# Patient Record
Sex: FEMALE | Race: WHITE | NOT HISPANIC OR LATINO | Employment: PART TIME | URBAN - METROPOLITAN AREA
[De-identification: names, ages, dates, MRNs, and addresses within clinical notes are randomized per-mention and may not be internally consistent; named-entity substitution may affect disease eponyms.]

---

## 2017-02-02 ENCOUNTER — OFFICE VISIT - HEALTHEAST (OUTPATIENT)
Dept: OBGYN | Facility: CLINIC | Age: 32
End: 2017-02-02

## 2017-02-02 DIAGNOSIS — Z00.00 ROUTINE GENERAL MEDICAL EXAMINATION AT A HEALTH CARE FACILITY: ICD-10-CM

## 2017-02-02 ASSESSMENT — MIFFLIN-ST. JEOR: SCORE: 1327.51

## 2017-02-07 LAB
BKR LAB AP ABNORMAL BLEEDING: NO
BKR LAB AP BIRTH CONTROL/HORMONES: NORMAL
BKR LAB AP CERVICAL APPEARANCE: NORMAL
BKR LAB AP GYN ADEQUACY: NORMAL
BKR LAB AP GYN INTERPRETATION: NORMAL
BKR LAB AP HPV REFLEX: NORMAL
BKR LAB AP LMP: NORMAL
BKR LAB AP PATIENT STATUS: NORMAL
BKR LAB AP PREVIOUS ABNORMAL: NORMAL
BKR LAB AP PREVIOUS NORMAL: 2014
HIGH RISK?: NO
HPV INTERPRETATION - HISTORICAL: NORMAL
HPV INTERPRETER - HISTORICAL: NORMAL
PATH REPORT.COMMENTS IMP SPEC: NORMAL
RESULT FLAG (HE HISTORICAL CONVERSION): NORMAL

## 2017-05-04 ENCOUNTER — OFFICE VISIT - HEALTHEAST (OUTPATIENT)
Dept: OBGYN | Facility: CLINIC | Age: 32
End: 2017-05-04

## 2017-05-04 DIAGNOSIS — N39.0 URINARY TRACT INFECTION, SITE NOT SPECIFIED: ICD-10-CM

## 2017-05-04 DIAGNOSIS — R30.0 DYSURIA: ICD-10-CM

## 2017-05-04 ASSESSMENT — MIFFLIN-ST. JEOR: SCORE: 1323.43

## 2017-08-17 ENCOUNTER — OFFICE VISIT - HEALTHEAST (OUTPATIENT)
Dept: FAMILY MEDICINE | Facility: CLINIC | Age: 32
End: 2017-08-17

## 2017-08-17 DIAGNOSIS — J33.9 NASAL POLYP: ICD-10-CM

## 2017-08-17 DIAGNOSIS — B37.9 YEAST INFECTION: ICD-10-CM

## 2017-08-17 DIAGNOSIS — N89.8 VAGINAL DISCHARGE: ICD-10-CM

## 2017-08-17 DIAGNOSIS — B96.89 BV (BACTERIAL VAGINOSIS): ICD-10-CM

## 2017-08-17 DIAGNOSIS — N76.0 BV (BACTERIAL VAGINOSIS): ICD-10-CM

## 2017-09-07 ENCOUNTER — COMMUNICATION - HEALTHEAST (OUTPATIENT)
Dept: FAMILY MEDICINE | Facility: CLINIC | Age: 32
End: 2017-09-07

## 2017-09-11 ENCOUNTER — COMMUNICATION - HEALTHEAST (OUTPATIENT)
Dept: FAMILY MEDICINE | Facility: CLINIC | Age: 32
End: 2017-09-11

## 2017-09-15 ENCOUNTER — COMMUNICATION - HEALTHEAST (OUTPATIENT)
Dept: FAMILY MEDICINE | Facility: CLINIC | Age: 32
End: 2017-09-15

## 2017-09-15 DIAGNOSIS — F41.9 ANXIETY: ICD-10-CM

## 2017-09-27 ENCOUNTER — AMBULATORY - HEALTHEAST (OUTPATIENT)
Dept: NURSING | Facility: CLINIC | Age: 32
End: 2017-09-27

## 2017-09-28 ENCOUNTER — OFFICE VISIT - HEALTHEAST (OUTPATIENT)
Dept: OTOLARYNGOLOGY | Facility: CLINIC | Age: 32
End: 2017-09-28

## 2017-09-28 DIAGNOSIS — J33.9 NASAL POLYPS: ICD-10-CM

## 2017-09-28 DIAGNOSIS — J30.89 NON-SEASONAL ALLERGIC RHINITIS DUE TO OTHER ALLERGIC TRIGGER: ICD-10-CM

## 2017-10-04 ENCOUNTER — HOSPITAL ENCOUNTER (OUTPATIENT)
Dept: CT IMAGING | Facility: HOSPITAL | Age: 32
Discharge: HOME | End: 2017-10-04
Attending: OTOLARYNGOLOGY

## 2017-10-04 DIAGNOSIS — J33.9 NASAL POLYPS: ICD-10-CM

## 2018-06-12 ENCOUNTER — OFFICE VISIT - HEALTHEAST (OUTPATIENT)
Dept: MIDWIFE SERVICES | Facility: CLINIC | Age: 33
End: 2018-06-12

## 2018-06-12 DIAGNOSIS — O26.899 RH NEGATIVE STATE IN ANTEPARTUM PERIOD: ICD-10-CM

## 2018-06-12 DIAGNOSIS — Z32.00 POSSIBLE PREGNANCY, NOT CONFIRMED: ICD-10-CM

## 2018-06-12 DIAGNOSIS — O20.0 BLOODY SHOW AND CRAMPING IN EARLY PREGNANCY: ICD-10-CM

## 2018-06-12 DIAGNOSIS — O20.9 VAGINAL BLEEDING IN PREGNANCY, FIRST TRIMESTER: ICD-10-CM

## 2018-06-12 DIAGNOSIS — Z67.91 RH NEGATIVE STATE IN ANTEPARTUM PERIOD: ICD-10-CM

## 2018-06-12 LAB — HCG SERPL-ACNC: ABNORMAL MLU/ML (ref 0–4)

## 2018-06-12 ASSESSMENT — MIFFLIN-ST. JEOR: SCORE: 1363.8

## 2018-06-13 ENCOUNTER — HOSPITAL ENCOUNTER (OUTPATIENT)
Dept: ULTRASOUND IMAGING | Facility: HOSPITAL | Age: 33
Discharge: HOME | End: 2018-06-13
Attending: ADVANCED PRACTICE MIDWIFE

## 2018-06-13 DIAGNOSIS — O20.9 VAGINAL BLEEDING IN PREGNANCY, FIRST TRIMESTER: ICD-10-CM

## 2018-06-13 DIAGNOSIS — O20.0 BLOODY SHOW AND CRAMPING IN EARLY PREGNANCY: ICD-10-CM

## 2018-06-13 LAB — ANTIBODY SCREEN: NEGATIVE

## 2018-06-14 ENCOUNTER — COMMUNICATION - HEALTHEAST (OUTPATIENT)
Dept: MIDWIFE SERVICES | Facility: CLINIC | Age: 33
End: 2018-06-14

## 2018-06-18 ENCOUNTER — COMMUNICATION - HEALTHEAST (OUTPATIENT)
Dept: ADMINISTRATIVE | Facility: CLINIC | Age: 33
End: 2018-06-18

## 2019-02-08 ENCOUNTER — HOME CARE/HOSPICE - HEALTHEAST (OUTPATIENT)
Dept: HOME HEALTH SERVICES | Facility: HOME HEALTH | Age: 34
End: 2019-02-08

## 2019-02-09 ENCOUNTER — HOME CARE/HOSPICE - HEALTHEAST (OUTPATIENT)
Dept: HOME HEALTH SERVICES | Facility: HOME HEALTH | Age: 34
End: 2019-02-09

## 2019-09-13 ENCOUNTER — OFFICE VISIT - HEALTHEAST (OUTPATIENT)
Dept: FAMILY MEDICINE | Facility: CLINIC | Age: 34
End: 2019-09-13

## 2019-09-13 DIAGNOSIS — S20.129A POSTPARTUM MILK BLEB: ICD-10-CM

## 2019-09-13 DIAGNOSIS — O92.29 POSTPARTUM MILK BLEB: ICD-10-CM

## 2019-10-07 ENCOUNTER — AMBULATORY - HEALTHEAST (OUTPATIENT)
Dept: NURSING | Facility: CLINIC | Age: 34
End: 2019-10-07

## 2019-10-31 ENCOUNTER — RECORDS - HEALTHEAST (OUTPATIENT)
Dept: ADMINISTRATIVE | Facility: OTHER | Age: 34
End: 2019-10-31

## 2019-10-31 ENCOUNTER — HOSPITAL ENCOUNTER (OUTPATIENT)
Dept: ULTRASOUND IMAGING | Facility: CLINIC | Age: 34
Discharge: HOME | End: 2019-10-31
Attending: OBSTETRICS & GYNECOLOGY

## 2019-10-31 DIAGNOSIS — N63.0 BREAST MASS IN FEMALE: ICD-10-CM

## 2020-07-27 ENCOUNTER — OFFICE VISIT - HEALTHEAST (OUTPATIENT)
Dept: FAMILY MEDICINE | Facility: CLINIC | Age: 35
End: 2020-07-27

## 2020-07-27 ENCOUNTER — COMMUNICATION - HEALTHEAST (OUTPATIENT)
Dept: SCHEDULING | Facility: CLINIC | Age: 35
End: 2020-07-27

## 2020-07-27 DIAGNOSIS — S80.869A INSECT BITE OF MULTIPLE SITES OF LOWER EXTREMITY WITH LOCAL REACTION, UNSPECIFIED LATERALITY, INITIAL ENCOUNTER: ICD-10-CM
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
## 2020-10-08 ENCOUNTER — AMBULATORY - HEALTHEAST (OUTPATIENT)
Dept: NURSING | Facility: CLINIC | Age: 35
End: 2020-10-08

## 2020-10-08 DIAGNOSIS — Z23 NEEDS FLU SHOT: ICD-10-CM

## 2021-05-30 VITALS — HEIGHT: 65 IN | BODY MASS INDEX: 23.49 KG/M2 | WEIGHT: 141 LBS

## 2021-05-30 VITALS — BODY MASS INDEX: 23.34 KG/M2 | WEIGHT: 140.1 LBS | HEIGHT: 65 IN

## 2021-05-31 VITALS — BODY MASS INDEX: 24 KG/M2 | WEIGHT: 142 LBS

## 2021-06-01 VITALS — HEIGHT: 65 IN | BODY MASS INDEX: 24.83 KG/M2 | WEIGHT: 149 LBS

## 2021-06-01 NOTE — PROGRESS NOTES
"ASSESSMENT:  1. Postpartum milk bleb         PLAN:  Monitor, heat, nipple balm. Make sure latch is good and re-latch if painful or seems like not proper. Continue to monitor for signs of infection. None present on exam today.   No problem-specific Assessment & Plan notes found for this encounter.      There are no Patient Instructions on file for this visit.    No orders of the defined types were placed in this encounter.    There are no discontinued medications.    No follow-ups on file.    CHIEF COMPLAINT:  Chief Complaint   Patient presents with     Breast Problem     pt is breast feeding, was seen by her OB/GYN yesterday and was told it was a \"bleb \" from breast feeding. Pt states it is red around the scab       HISTORY OF PRESENT ILLNESS:  Diana is a 34 y.o. female here today for follow-up for a small lesion on her right breast.  Patient was salt seen by her OB/GYN yesterday and told it was a bleb.  She wanted a second opinion today.  Area is not painful or tender.  She did have a recent mastitis but this is resolved.  She states it is more red on the other side and is concerned that there could be a problem.  Baby does not have the best latch, she thinks this may have been the cause.  Not causing any pain or discomfort.  She is breast-feeding fine.    REVIEW OF SYSTEMS:   Pertinent items are noted in HPI.  All other systems are negative    PFSH:  Reviewed, no changes    TOBACCO USE:  Social History     Tobacco Use   Smoking Status Former Smoker   Smokeless Tobacco Former User     Quit date: 8/17/2010   Tobacco Comment    4-5 years smoked        VITALS:  Vitals:    09/13/19 1120   BP: 138/89   Patient Site: Left Arm   Patient Position: Sitting   Cuff Size: Adult Regular   Pulse: (!) 112   Resp: 16   Weight: 149 lb (67.6 kg)     Wt Readings from Last 3 Encounters:   09/13/19 149 lb (67.6 kg)   02/07/19 164 lb (74.4 kg)   06/12/18 149 lb (67.6 kg)       PHYSICAL EXAM:   /89 (Patient Site: Left Arm, Patient " Position: Sitting, Cuff Size: Adult Regular)   Pulse (!) 112   Resp 16   Wt 149 lb (67.6 kg)   BMI 25.58 kg/m    General appearance: alert, appears stated age and cooperative  Breasts: Inspection negative, right nipple with milk bleb left upper, nontender  Skin: Skin color, texture, turgor normal. No rashes or lesions    DATA REVIEWED:  Additional History from Old Records Summarized (2): 0  Decision to Obtain Records (1): 0  Radiology Tests Summarized or Ordered (1): 0  Labs Reviewed or Ordered (1): 0  Medicine Test Summarized or Ordered (1): 0  Independent Review of EKG or X-RAY(2 each): 0    The visit lasted a total of 20 minutes face to face with the patient. Over 50% of the time was spent counseling and educating the patient about plan of care.    MEDICATIONS:  Current Outpatient Medications   Medication Sig Dispense Refill     prenatal no115-iron-folic acid 29 mg iron- 1 mg Chew Chew daily.       No current facility-administered medications for this visit.        This note has been dictated using voice recognition software. Any grammatical or context distortions are unintentional and inherent to the software

## 2021-06-03 VITALS
BODY MASS INDEX: 25.58 KG/M2 | HEART RATE: 112 BPM | SYSTOLIC BLOOD PRESSURE: 138 MMHG | DIASTOLIC BLOOD PRESSURE: 89 MMHG | WEIGHT: 149 LBS | RESPIRATION RATE: 16 BRPM

## 2021-06-08 NOTE — PROGRESS NOTES
Assessment:     1. Routine general medical examination at a health care facility  Gynecologic Cytology (PAP Smear)        Plan:      I recommended she eat a healthy diet and exercise on a regular basis. Pap smear and HPV testing performed today. Otherwise up to date on preventive measures. Routine exams encouraged. Discussed stress reduction techniques.       Subjective:     Diana Eugene is a 31 y.o. female who presents for an annual exam.     The patient reports that there is not domestic violence in her life.     Healthy Habits:   Regular Exercise: Yes  Sunscreen Use: Yes  Healthy Diet: Yes  Dental Visits Regularly: Yes  Sexually active: Yes  Colonoscopy: N/A  Prevention of Osteoporosis: Yes  Last Dexa: N/A    Immunization History   Administered Date(s) Administered     Influenza, seasonal,quad inj 36+ mos 11/09/2015, 11/22/2016     Influenza, seasonal,quad inj 6-35 mos 11/25/2014     Immunization status: up to date and documented.    Gynecologic History  No LMP recorded.  Contraception: none  Last Pap: 2014. Results were: normal      OB History   No data available       Current Outpatient Prescriptions   Medication Sig Dispense Refill     CHOLECALCIFEROL, VITAMIN D3, (VITAMIN D3 ORAL) Take by mouth.       DOCOSAHEXANOIC ACID/EPA (FISH OIL ORAL) Take by mouth.       fluticasone (FLONASE) 50 mcg/actuation nasal spray 1 spray into each nostril daily.       montelukast (SINGULAIR) 10 mg tablet TAKE 1 TABLET BY MOUTH EVERY DAY 30 tablet 1     VITAMIN A ORAL Take by mouth.       VITAMIN E ACETATE (VITAMIN E ORAL) Take by mouth.       predniSONE (DELTASONE) 10 MG tablet 40mg po QD for 3 days, then 30mg po QD for 3 days, then 20mg po QD for 3 days, then 10mg po QD for 3 days, then stop 30 tablet 0     No current facility-administered medications for this visit.      Past Medical History:   Diagnosis Date     Nasal polyps      History reviewed. No pertinent surgical history.  No known drug allergies  Family History  "  Problem Relation Age of Onset     Heart disease Maternal Grandfather      Stroke Maternal Grandfather      Colon cancer Neg Hx      Social History     Social History     Marital status:      Spouse name: N/A     Number of children: N/A     Years of education: N/A     Occupational History     Not on file.     Social History Main Topics     Smoking status: Former Smoker     Smokeless tobacco: Not on file     Alcohol use Not on file     Drug use: Not on file     Sexual activity: Yes     Birth control/ protection: None     Other Topics Concern     Not on file     Social History Narrative       Review of Systems  General:  Negative except as noted above  Eyes: Negative except as noted above  Ears/Nose/Throat: Negative except as noted above  Cardiovascular: Negative except as noted above  Respiratory:  Negative except as noted above  Gastrointestinal:  Negative except as noted above  Musculoskeletal:  Negative except as noted above  Skin: Negative except as noted above  Neurologic: Negative except as noted above  Psychiatric: Negative except as noted above  Endocrine: Negative except as noted above  Heme/Lymphatic: Negative except as noted above   Allergic/Immunologic: Negative except as noted above      Objective:      Visit Vitals     /74 (Patient Site: Left Arm, Patient Position: Sitting, Cuff Size: Adult Regular)     Pulse 72     Ht 5' 4.5\" (1.638 m)     Wt 141 lb (64 kg)     SpO2 100%     BMI 23.83 kg/m2       Physical Exam:  General Appearance: Alert, cooperative, no distress.  Head: Normocephalic, without obvious abnormality, atraumatic  Eyes: PERRL, conjunctiva/corneas clear, EOM's intact  Ears: Normal TM's and external ear canals, both ears  Nose: Nares normal, septum midline,mucosa normal, no drainage  Throat: Lips, mucosa, and tongue normal  Neck: Supple, symmetrical, trachea midline, no adenopathy;  thyroid: not enlarged, symmetric, no tenderness/mass/nodules  Back: Symmetric, no curvature, ROM " normal, no CVA tenderness  Lungs: Clear to auscultation bilaterally, respirations unlabored  Breasts: No breast masses, tenderness, asymmetry, or nipple discharge.  Heart: Regular rate and rhythm, S1 and S2 normal, no murmur, rub, or gallop, Abdomen: Soft, non-tender, bowel sounds active all four quadrants,  no masses, no organomegaly  Pelvic: Normal-appearing female genitalia.  No adnexal masses or cervical motion tenderness on bimanual exam. Pap smear and HPV testing obtained today.   Extremities: Extremities normal, atraumatic, no cyanosis or edema  Skin: Skin color, texture, turgor normal, no rashes or lesions  Lymph nodes: Cervical, supraclavicular, and axillary nodes normal  Neurologic: Normal  Psych: Normal affect.  Does not appear anxious or depressed.

## 2021-06-10 NOTE — PROGRESS NOTES
"Diana Eugene is a 34 y.o. female who is being evaluated via a billable video visit.      The patient has been notified of following:     \"This video visit will be conducted via a call between you and your physician/provider. We have found that certain health care needs can be provided without the need for an in-person physical exam.  This service lets us provide the care you need with a video conversation.  If a prescription is necessary we can send it directly to your pharmacy.  If lab work is needed we can place an order for that and you can then stop by our lab to have the test done at a later time.    Video visits are billed at different rates depending on your insurance coverage. Please reach out to your insurance provider with any questions.    If during the course of the call the physician/provider feels a video visit is not appropriate, you will not be charged for this service.\"    Patient has given verbal consent to a Video visit? Yes  How would you like to obtain your AVS? AVS Preference: MyChart.  If dropped by the video visit, the video invitation should be sent to: Other e-mail: my chart  Will anyone else be joining your video visit? No        Video Start Time: 1:59 PM    Additional provider notes:     Assessment/Plan:      Insect bite of multiple sites of lower extremity with local reaction, unspecified laterality, initial encounter  Discussed with patient that the bilateral and multiple nature of the bites would be atypical for tick bites as no ticks were found.  Also the local reactions do not signify greater risk of a transmitted disease like West Nile without other symptoms.  She denies systemic symptoms, other easy bleeding or bruising.  She will try a non-drowsy antihistamine during the day and benadryl at night.  Did not recommend any local treatment of the bites as they are not symptomatic.  If these multiply or worsen, she will follow up for further evaluation.        Subjective:       " Diana Eugene is a 34 y.o. female who presents for a discussion of some unusual appearing bug bites.  She was outside for much of the weekend, and sustained several insect bites to her thighs and ankles.  Her ankles look normal with mild bumps related to the bites, however on her thighs, she has developed an atypical reaction with bruising around the bites.  She has not had this happen in the past.  She denies fever, chills or malaise.  She denies unexplained bruising elsewhere, nosebleeds or easy bleeding with teeth brushing.  The bites are not particularly itchy.  She has taken some benadryl.      The following portions of the patient's history were reviewed and updated as appropriate: allergies, current medications, past medical history, past social history and problem list.      Current Outpatient Medications:      prenatal no115-iron-folic acid 29 mg iron- 1 mg Chew, Chew daily., Disp: , Rfl:     Review of Systems   Pertinent items are noted in HPI.      Objective:      There were no vitals taken for this visit.    General appearance: alert, appears stated age and cooperative  Head: Normocephalic, without obvious abnormality, atraumatic  Eyes: conjunctivae clear, sclerae anicteric  Lungs: breathing unlabored, no cough during the visit  Skin: central excoriation with ring of normal skin and a surrounding purple ring consistent with contusion on each inner thigh and 2 additional lesions outer right thigh  Neurologic: Grossly normal, alert and oriented x3, good historian              Video-Visit Details    Type of service:  Video Visit    Video End Time (time video stopped): 2:06 PM  Originating Location (pt. Location): Home    Distant Location (provider location):  [facility]/OB     Platform used for Video Visit: Shyann Zarate MD

## 2021-06-10 NOTE — PROGRESS NOTES
"Assessment / Impression     1. Urinary tract infection, site not specified     2. Dysuria  Urinalysis-UC if Indicated    Culture, Urine       Plan:     1. Bactrim DS BID for 7 days dispensed. Encouraged water intake. Discussed signs and sxs of when to return if sxs don't resolve or worsen.     Subjective:      HPI: Diana Eugene is a 31 y.o. female with  Increased urinary urgency and frequency that started 2 nights ago. States has never had UTI, but developed sxs that got somewhat better yesterday and now are worse today. Denies hematuria, back pain, fever, or N/V. Otherwise healthy without concerns. Hasn't tried anything otc.       Review of Systems  Pertinent items are noted in HPI.       Objective:     /76 (Patient Site: Left Arm, Patient Position: Sitting, Cuff Size: Adult Regular)  Pulse 72  Resp 16  Ht 5' 4.5\" (1.638 m)  Wt 140 lb 1.6 oz (63.5 kg)  LMP 04/17/2017 (Approximate)  Breastfeeding? No  BMI 23.68 kg/m2  Physical Examination: General appearance - alert, well appearing, and in no distress  Mouth: mucous membranes moist, pharynx normal without lesions  Neck: supple, no significant adenopathy  Lymphatics: no palpable lymphadenopathy  Chest: clear to auscultation, no wheezes, rales or rhonchi, symmetric air entry  Heart: normal rate, regular rhythm, normal S1, S2, no murmurs, rubs, clicks or gallops  Abdomen: soft, nontender, nondistended, no masses or organomegaly  Back: No CVA tenderness bilaterally    Labs: UA + blood, LE, protein, bacteria.     "

## 2021-06-12 NOTE — PROGRESS NOTES
ASSESSMENT and PLAN:  1. Vaginal discharge  -Wet prep returned positive for clue cells and  yeast.  We discussed treating with the 5 days of metronidazole as well as starting fluconazole for 3 doses one every 3 days.  If symptoms persist she should let me know and come back for reevaluation.  We discussed avoiding tight constrictive clothing on these hot summer months, cotton undergarments, airing out in the evening, showering shortly after exercise and sweating.  - Wet Prep, Vaginal    2. Nasal polyp  -Following with allergist.  Stable as long as using nasal steroid spray and antihistamine    3.  Heart palpitations-very intermittent and likely nonpathologic at this time.  Discussed with patient reasons for which to pursue further workup and we discussed potential triggers.  Continue to monitor.  Normal exam    -Tdap up-to-date from 3 years ago at [facility] and will update us in health maintenance      Reviewed previous office notes and labs  There are no Patient Instructions on file for this visit.    Orders Placed This Encounter   Procedures     Wet Prep, Vaginal     There are no discontinued medications.    No Follow-up on file.      The visit lasted a total of 15 minutes face to face with the patient. Over 50% of the time was spent counseling and educating the patient     CHIEF COMPLAINT:  Chief Complaint   Patient presents with     Vaginal Itching     Vaginal itching and OTC testing the Ph was abnormal.  Some discharge. Started on sunday, feels a little better today.      Establish Care       HISTORY OF PRESENT ILLNESS:  Diana Eugene is a 31 y.o. female presents to establish care here today.  Her main concern is vaginal itching and discharge that she has noticed since Sunday.  She has no pertinent medical history and no history of diabetes.  No recurrent vaginosis.  She has never had yeast infection before and notes that she is going to  some Monistat but the instructions had not to self treat if  she has never had a yeast infection.  she picked up pH strips and it had a slight color change so she decided to come in and get checked out.  It is a yellowish discharge.  Very pruritic and sometimes burns at the end of urination otherwise no other urinary symptoms.  Has not been on antibiotics since May.  Only has one partner and it is her  and no concerns about STDs.  Does not do any douching.  No changes in fragrances or soaps.  She does note that she has been biking a lot lately and over the weekend did not shower for a few days and did not know if that had triggered things.  Only other pertinent history is nasal polyps and has seen an allergist.  Is using a daily antihistamine such as Claritin and as long as she is taking the medication she does well.  They got a new kitten recently and seems to have had more of a reaction to the cat even though she is never tested positive for cat allergy and previous cat did not have as many issues.  She also notes that she has had sinus-like reactions when she takes Advil so makes a conscious effort not to take.  She is using a nasal steroid spray.  She thinks it might be Nasacort but cannot remember if it is that her fluticasone.  She is not using the Singulair as long as she keeps up on her Claritin and also notes that she was given prednisone by the allergist but has not yet use the medication.  Looks like she did see an ENT doctor several years ago.  No CT imaging as of yet and she will be following up if the nasal polyp does not improve.  On her problem history is a history of heart palpitations that she may note of in 2014.  They do not seem to worsen with working out which she no longer does and are not associated with any lightheadedness or chest pressure.  When they come on once a week it may be last 10-30 seconds.  No history of arrhythmia in the family.  She does consume caffeine at least one cup a day.  Has not noted any triggers.  No history of  anxiousness.  Uncomplicated pregnancies and likely updated her Tdap 3 years ago.    REVIEW OF SYSTEMS:    Comprehensive review of systems is negative except as noted in the HPI.     PFSH:  Tobacco use and medications reviewed below.     TOBACCO USE:  History   Smoking Status     Former Smoker   Smokeless Tobacco     Former User     Quit date: 8/17/2010     Comment: 4-5 years smoked        VITALS:  Vitals:    08/17/17 1050   BP: 110/82   Pulse: 72   Resp: 16   Temp: 98.4  F (36.9  C)   TempSrc: Oral   Weight: 142 lb (64.4 kg)     Wt Readings from Last 3 Encounters:   08/17/17 142 lb (64.4 kg)   05/04/17 140 lb 1.6 oz (63.5 kg)   02/02/17 141 lb (64 kg)       PHYSICAL EXAM:  Constitutional:  Reveals a 31 y.o. female in NAD.  Vitals:  Per nursing notes.  Neck:  Supple, thyroid not palpable.  Cardiac:  Regular rate and rhythm without murmurs, rubs, or gallops. Carotids without bruits. Legs without edema. Palpation of the radial pulse regular.  Lungs: Clear.  Respiratory effort normal.  Skin:   Without rash, bruise, or palpable lesions.  Rheumatologic: Normal joints and nails of the hands.  Neurologic:  Cranial nerves II-XII intact.     Psychiatric:  Mood appropriate, memory intact.   Vaginal exam: Normal external exam with no lesions or erythema.  Internal speculum exam normal with normal-appearing cervix.  There is increased amount of vaginal discharge that is thin and yellowish appearing.      MEDICATIONS:  Current Outpatient Prescriptions   Medication Sig Dispense Refill     CHOLECALCIFEROL, VITAMIN D3, (VITAMIN D3 ORAL) Take by mouth.       DOCOSAHEXANOIC ACID/EPA (FISH OIL ORAL) Take by mouth.       fluticasone (FLONASE) 50 mcg/actuation nasal spray 1 spray into each nostril daily.       montelukast (SINGULAIR) 10 mg tablet TAKE 1 TABLET BY MOUTH EVERY DAY 30 tablet 1     predniSONE (DELTASONE) 10 MG tablet 40mg po QD for 3 days, then 30mg po QD for 3 days, then 20mg po QD for 3 days, then 10mg po QD for 3 days,  then stop 30 tablet 0     VITAMIN A ORAL Take by mouth.       VITAMIN E ACETATE (VITAMIN E ORAL) Take by mouth.       No current facility-administered medications for this visit.

## 2021-06-13 NOTE — PROGRESS NOTES
HPI: This patient is a 33yo F who presents back for re-evaluation of nasal congestion. The patient reports chronic nasal congestion over the past several years. She has tried sprays now with minimal improvement and got sidetracked with other life happenings to come back and address this sooner. Has not had true sinus infection issues such as facial pain, purulent drainage, epistaxis, or dental pain. Does have decreased sense of smell, but not true anosmia. Denies fevers, weight loss, epistaxis, headaches, other significant symptoms.      Past medical history, surgical history, social history, family history, medications, and allergies have been reviewed with the patient and are documented above.     Review of Systems: a 10-system review was performed. Pertinent positives are noted in the HPI and on a separate scanned document in the chart.     PHYSICAL EXAMINATION:  GEN: no acute distress, normocephalic  EYES: extraocular movements are intact, pupils are equal and round. Sclera clear.   NOSE: anterior nares are patent. There are no masses or lesions. The septum is non-obstructing. Significantly boggy and enlarged turbinates. Difficult to fully assess the upper nasal cavity due to turbinate enlargement.  OC/OP: clear, dentition is in good repair. The tongue and palate are fully mobile and symmetric. The floor of mouth, base of tongue, and tonsils are soft and symmetric.  NECK: soft and supple. No lymphadenopathy or masses. Airway is midline.  NEURO: CN II-XII are intact bilaterally. alert and oriented. No nystagmus. Gait is normal.  PULM: breathing comfortably on room air, normal chest expansion with respiration     MEDICAL DECISION-MAKING: This patient is a 33yo F with continued allergic rhinitis and congestion. Discussed continuation of the nasal steroid sprays. Will also check a CT scan to evaluate for polyps, etc.

## 2021-06-16 PROBLEM — J33.9 NASAL POLYP: Status: ACTIVE | Noted: 2017-08-17

## 2021-06-18 NOTE — PROGRESS NOTES
Assessment/Plan:        Diagnoses and all orders for this visit:  Early pregnancy between 2y6s-4m1b with vaginal spotting and right sided dull pain.    Menstrual cycles ~32 days long    Possible pregnancy, not confirmed  -     Beta-hCG, Quantitative  -     Beta-hCG, Quantitative; Future    Vaginal bleeding in pregnancy, first trimester  -     HML Antibody Screen  -     rho(D) immune globulin injection 300 mcg (RHOPHYLAC); Inject 2 mL (300 mcg total) into the shoulder, thigh, or buttocks once.  -     US OB < 14 Weeks With Transvaginal; Future; Expected date: 6/12/18    Rh negative state in antepartum period  -     rho(D) immune globulin injection 300 mcg (RHOPHYLAC); Inject 2 mL (300 mcg total) into the shoulder, thigh, or buttocks once. (Will receive before leaving clinic)    Bloody show and cramping in early pregnancy  -     US OB < 14 Weeks With Transvaginal; Future; Expected date: 6/12/18    Discussed Diana's current s/sx.  Acknowledged that bleeding is scant and difficult to know what it means.  Explained rationale of beta-quant x2 (one today and one in 48 hours).  Also discussed importance of u/s in ruling out ectopic.  We were able to schedule the u/s for 6/13 @ 1245 at [facility].  I discussed that if the pregnancy is <6 weeks, that likely a heartbeat will not be seen, but our purpose at this point is to r/o ectopic due to the vague, dull pain she has on the right side.  Discussed that bleeding is common in first trimester and that ~25% of women see some spotting or bleeding in otherwise viable pregnancies.  Also discussed that if it is an SAB, there is nothing she did to cause this.      Reviewed travel precautions if she does end up traveling.      Explained follow-up with CNMs on labs, ultrasound, etc.    ROCHELLE Cam CNM  6/12/2018  4:46 PM    Total time spent with patient 45 minutes, >50% counseling, education and coordination of care.              Subjective:    Patient ID: Diana HURD  Jabier is a 32 y.o. female here for confirmation of pregnancy, with concerns regarding spotting in early pregnancy (see ROS) and dull right-sided abdominal pain which first started on 6/9/18.  She thought these symptoms signaled the start of her period, however, when her period still had not come, she took a Home UPT yesterday and it was positive.  Her  is thrilled, she is a little hesitant, only because they are planning to leave for a [place] cruise in one week.  Their flight leaves on 6/20.  Diana is Rh negative - in her chart from delivery in 2013.  Her menstrual cycles come about every 32 days.  Her LMP was 3/27/18, and then she had her next LMP on 5/1/18, so she expected her next period to come around 6/5/18.  This would put her around 3b7h-8f9w pregnant (probably more in the 5 week range).  Denies s/sx of vaginal infection - states she had BV/yeast last year and this is not like that.      HPI    The following portions of the patient's history were reviewed and updated as appropriate: allergies, current medications, past family history, past medical history, past social history, past surgical history and problem list.    Review of Systems   Constitutional: Negative.    HENT: Negative.    Eyes: Negative.    Respiratory: Negative.    Cardiovascular: Negative.    Gastrointestinal: Negative.    Genitourinary:        Vaginal spotting - brown discharge - noted when she wipes that started on 6/9/18.  Also noticing right sided dull pain.   Musculoskeletal: Negative.    Skin: Negative.    Allergic/Immunologic: Negative.    Neurological: Negative.    Hematological: Negative.    Psychiatric/Behavioral: Negative.              Objective:    Physical Exam   Constitutional: She is oriented to person, place, and time. She appears well-developed and well-nourished.   Eyes: Pupils are equal, round, and reactive to light.   Pulmonary/Chest: Effort normal.   Musculoskeletal: Normal range of motion.   Neurological: She  is alert and oriented to person, place, and time.   Skin: Skin is warm and dry.   Psychiatric: She has a normal mood and affect. Her behavior is normal. Judgment and thought content normal.

## 2021-06-19 ENCOUNTER — HEALTH MAINTENANCE LETTER (OUTPATIENT)
Age: 36
End: 2021-06-19

## 2021-07-14 PROBLEM — Z34.90 PREGNANT: Status: RESOLVED | Noted: 2019-02-07 | Resolved: 2020-07-27

## 2021-09-10 ENCOUNTER — OFFICE VISIT (OUTPATIENT)
Dept: FAMILY MEDICINE | Facility: CLINIC | Age: 36
End: 2021-09-10

## 2021-09-10 VITALS
HEART RATE: 107 BPM | TEMPERATURE: 98.7 F | SYSTOLIC BLOOD PRESSURE: 132 MMHG | BODY MASS INDEX: 20.98 KG/M2 | DIASTOLIC BLOOD PRESSURE: 84 MMHG | WEIGHT: 122.25 LBS | RESPIRATION RATE: 16 BRPM

## 2021-09-10 DIAGNOSIS — R19.01 RIGHT UPPER QUADRANT ABDOMINAL MASS: Primary | ICD-10-CM

## 2021-09-10 DIAGNOSIS — Z23 NEED FOR PROPHYLACTIC VACCINATION AND INOCULATION AGAINST INFLUENZA: ICD-10-CM

## 2021-09-10 DIAGNOSIS — R45.89 ANXIETY ABOUT HEALTH: ICD-10-CM

## 2021-09-10 PROCEDURE — 99214 OFFICE O/P EST MOD 30 MIN: CPT | Performed by: FAMILY MEDICINE

## 2021-09-10 RX ORDER — ESCITALOPRAM OXALATE 10 MG/1
10 TABLET ORAL DAILY
Qty: 30 TABLET | Refills: 1 | Status: SHIPPED | OUTPATIENT
Start: 2021-09-10 | End: 2022-01-07

## 2021-09-10 ASSESSMENT — ANXIETY QUESTIONNAIRES
2. NOT BEING ABLE TO STOP OR CONTROL WORRYING: NEARLY EVERY DAY
1. FEELING NERVOUS, ANXIOUS, OR ON EDGE: NEARLY EVERY DAY
3. WORRYING TOO MUCH ABOUT DIFFERENT THINGS: NEARLY EVERY DAY
7. FEELING AFRAID AS IF SOMETHING AWFUL MIGHT HAPPEN: NEARLY EVERY DAY
4. TROUBLE RELAXING: NEARLY EVERY DAY
GAD7 TOTAL SCORE: 21
5. BEING SO RESTLESS THAT IT IS HARD TO SIT STILL: NEARLY EVERY DAY
6. BECOMING EASILY ANNOYED OR IRRITABLE: NEARLY EVERY DAY

## 2021-09-10 ASSESSMENT — PATIENT HEALTH QUESTIONNAIRE - PHQ9: SUM OF ALL RESPONSES TO PHQ QUESTIONS 1-9: 0

## 2021-09-10 NOTE — PATIENT INSTRUCTIONS
Patient Education     Anxiety Reaction  Anxiety is the feeling we all get when we think something bad might happen. It is a normal response to stress and normally causes only a mild reaction. When anxiety becomes more severe, it can interfere with daily life. In some cases, you may not even be aware of what you re anxious about. There may also be a genetic link. Or it may be a learned behavior in the home.   Both psychological and physical triggers cause stress reaction. It's often a response to fear or emotional stress, real or imagined. This stress may come from home, family, work, or social relationships.   During an anxiety reaction, you may feel:    Helpless    Nervous    Depressed    Grouchy  Your body may show signs of anxiety in many ways. You may experience:    Dry mouth    Shakiness    Dizziness    Weakness    Trouble breathing    Breathing fast (hyperventilating)    Chest pressure    Sweating    Headache    Nausea    Diarrhea    Tiredness    Inability to sleep    Sexual problems  Home care    Try to find the sources of stress in your life. They may not be obvious. These may include:  ? Daily hassles of life (such as traffic jams, missed appointments, or car troubles)  ? Major life changes, both good (new baby or job promotion) and bad (loss of job or loss of loved one)  ? Overload (feeling that you have too many responsibilities and can't take care of all of them at once)  ? Feeling helpless or feeling that your problems can't be solved    Notice how your body reacts to stress. Learn to listen to your body signals. This will help you take action before the stress becomes severe.    When you can, do something about the source of your stress. (Avoid hassles, limit the amount of change that happens in your life at one time, and take a break when you feel overloaded).    Unfortunately, many stressful situations can't be avoided. It is necessary to learn how to better manage stress. There are many proven  methods that will reduce your anxiety. These include simple things such as exercise, good nutrition, and adequate rest. Also, there are certain techniques that are helpful:  ? Relaxation  ? Breathing exercises  ? Visualization  ? Biofeedback  ? Meditation  For more information about this, talk with your healthcare provider. Or check online or at your local library or bookstore. You'll find many books and audiobooks on this subject.   Follow-up care  If you feel your anxiety is not responding to self-help measures, call your healthcare provider or make an appointment with a counselor. You may need short-term psychological counseling or medicine to help you manage stress.   Call 911  Call 911 if any of these happen:     Trouble breathing    Confusion    Drowsiness or trouble waking up    Fainting or loss of consciousness    Rapid heart rate    Seizure    New chest pain that becomes more severe, lasts longer, or spreads into your shoulder, arm, neck, jaw, or back  When to get medical advice  Call your healthcare provider right away if any of these happen:    Your symptoms get worse    Severe headache not eased by rest and mild pain reliever  Ynes last reviewed this educational content on 4/1/2020 2000-2021 [employer]. All rights reserved. This information is not intended as a substitute for professional medical care. Always follow your healthcare professional's instructions.

## 2021-09-10 NOTE — PROGRESS NOTES
Assessment & Plan     ICD-10-CM    1. Right upper quadrant abdominal mass  R19.01 CT Abdomen w/o Contrast   2. Need for prophylactic vaccination and inoculation against influenza  Z23    3. Anxiety about health  F41.8 MENTAL HEALTH REFERRAL  - Adult; Outpatient Treatment; Individual/Couples/Family/Group Therapy/Health Psychology; [facility] [phone]; We will contact you to schedule the appointment or please call with any questions     escitalopram (LEXAPRO) 10 MG tablet     Medical decision making: Patient feels a lump on her right upper quadrant which I believe is a delineation of rectus muscle. However, we will proceed with a CT scan to look for hernia.  She is extremely anxious and with treated KARLOS as below and started on Lexapro with a referral for counseling.  Patient will follow with me in 4 weeks time.       MEDICATIONS:   Orders Placed This Encounter   Medications     escitalopram (LEXAPRO) 10 MG tablet     Sig: Take 1 tablet (10 mg) by mouth daily     Dispense:  30 tablet     Refill:  1          - Continue other medications without change  See Patient Instructions    Return in about 4 weeks (around 10/8/2021) for using a ELVPHDisit, using a video visit.    Cristiane Peralta MD  [facility]    Subjective    Chief Complaint   Patient presents with     Mass     pt feels a lump RUQ x 1 week, no pain or injury      Imm/Inj     Flu Shot       Diana is a 36 year old who presents for the following health issues     HPI: Patient felt a lump in right upper quadrant. She informs me that she feels anxious and any abnormal finding makes her feel very worried especially about her kids in case something were to happen to her.  This lump is more prominent when she is standing and she is not able to feel it when laying down.    Review of Systems   Constitutional, HEENT, cardiovascular, pulmonary, gi and gu systems are negative, except as otherwise noted.       Objective    /84   Pulse 107   Temp 98.7  F (37.1  C) (Oral)   Resp 16   Wt 55.5 kg (122 lb 4 oz)   LMP 08/21/2021   BMI 20.98 kg/m    Body mass index is 20.98 kg/m .  Physical Exam   GENERAL: healthy, alert and no distress  NECK: no adenopathy, no asymmetry, masses, or scars and thyroid normal to palpation  RESP: lungs clear to auscultation - no rales, rhonchi or wheezes  CV: regular rate and rhythm, normal S1 S2, no S3 or S4, no murmur, click or rub, no peripheral edema and peripheral pulses strong  ABDOMEN: soft, nontender, no hepatosplenomegaly, no masses and bowel sounds normal  MS: no gross musculoskeletal defects noted, no edema    GAD7 score: 21

## 2021-10-10 ENCOUNTER — HEALTH MAINTENANCE LETTER (OUTPATIENT)
Age: 36
End: 2021-10-10

## 2021-11-09 ENCOUNTER — IMMUNIZATION (OUTPATIENT)
Dept: FAMILY MEDICINE | Facility: CLINIC | Age: 36
End: 2021-11-09

## 2021-11-09 PROCEDURE — 90686 IIV4 VACC NO PRSV 0.5 ML IM: CPT

## 2021-11-09 PROCEDURE — 90471 IMMUNIZATION ADMIN: CPT

## 2021-11-23 ENCOUNTER — IMMUNIZATION (OUTPATIENT)
Dept: NURSING | Facility: CLINIC | Age: 36
End: 2021-11-23
Payer: OTHER GOVERNMENT

## 2021-11-23 PROCEDURE — 91300 PR COVID VAC PFIZER DIL RECON 30 MCG/0.3 ML IM: CPT

## 2021-11-23 PROCEDURE — 0004A PR COVID VAC PFIZER DIL RECON 30 MCG/0.3 ML IM: CPT

## 2022-01-07 ENCOUNTER — OFFICE VISIT (OUTPATIENT)
Dept: FAMILY MEDICINE | Facility: CLINIC | Age: 37
End: 2022-01-07
Payer: COMMERCIAL

## 2022-01-07 VITALS
SYSTOLIC BLOOD PRESSURE: 135 MMHG | HEART RATE: 84 BPM | HEIGHT: 65 IN | BODY MASS INDEX: 20.29 KG/M2 | DIASTOLIC BLOOD PRESSURE: 84 MMHG | RESPIRATION RATE: 12 BRPM | OXYGEN SATURATION: 100 % | WEIGHT: 121.8 LBS | TEMPERATURE: 97.9 F

## 2022-01-07 DIAGNOSIS — R19.00 ABDOMINAL MASS, UNSPECIFIED ABDOMINAL LOCATION: ICD-10-CM

## 2022-01-07 DIAGNOSIS — F40.240 CLAUSTROPHOBIA: ICD-10-CM

## 2022-01-07 DIAGNOSIS — R45.89 ANXIETY ABOUT HEALTH: ICD-10-CM

## 2022-01-07 DIAGNOSIS — Z00.01 ENCOUNTER FOR GENERAL ADULT MEDICAL EXAMINATION WITH ABNORMAL FINDINGS: Primary | ICD-10-CM

## 2022-01-07 DIAGNOSIS — Z12.4 SCREENING FOR CERVICAL CANCER: ICD-10-CM

## 2022-01-07 DIAGNOSIS — F41.1 GAD (GENERALIZED ANXIETY DISORDER): ICD-10-CM

## 2022-01-07 PROCEDURE — 99213 OFFICE O/P EST LOW 20 MIN: CPT | Mod: 25 | Performed by: FAMILY MEDICINE

## 2022-01-07 PROCEDURE — 99395 PREV VISIT EST AGE 18-39: CPT | Performed by: FAMILY MEDICINE

## 2022-01-07 PROCEDURE — 87624 HPV HI-RISK TYP POOLED RSLT: CPT | Performed by: FAMILY MEDICINE

## 2022-01-07 PROCEDURE — G0123 SCREEN CERV/VAG THIN LAYER: HCPCS | Performed by: FAMILY MEDICINE

## 2022-01-07 RX ORDER — PROPRANOLOL HYDROCHLORIDE 10 MG/1
TABLET ORAL
Qty: 30 TABLET | Refills: 0 | Status: SHIPPED | OUTPATIENT
Start: 2022-01-07

## 2022-01-07 RX ORDER — ALPRAZOLAM 0.25 MG
TABLET ORAL
Qty: 4 TABLET | Refills: 0 | Status: SHIPPED | OUTPATIENT
Start: 2022-01-07

## 2022-01-07 RX ORDER — ESCITALOPRAM OXALATE 5 MG/1
5 TABLET ORAL DAILY
Qty: 30 TABLET | Refills: 1 | Status: SHIPPED | OUTPATIENT
Start: 2022-01-07

## 2022-01-07 ASSESSMENT — MIFFLIN-ST. JEOR: SCORE: 1235.42

## 2022-01-07 NOTE — PROGRESS NOTES
SUBJECTIVE:   CC: Diana Eugene is an 36 year old woman who presents for preventive health visit.        Patient has been advised of split billing requirements and indicates understanding: Yes  Healthy Habits:     Getting at least 3 servings of Calcium per day:  Yes    Bi-annual eye exam:  Yes    Dental care twice a year:  Yes    Sleep apnea or symptoms of sleep apnea:  None    Diet:  Regular (no restrictions)    Frequency of exercise:  1 day/week    Duration of exercise:  15-30 minutes    Taking medications regularly:  Yes    Medication side effects:  None    PHQ-2 Total Score: 0    Additional concerns today:  No      Did not presents for annual physical.  Came in in September. When stands up feels like something moving in there and didn't know if that is normal.  The provider who saw her did not seem concerned that there was a palpable mass.  She indicates she cannot feel it if laying down.  Seems like she can reduce the area but it is not painful.Side of belly button and to the right. Says feels large.   Was prescribed lexapro during that time 10 mg but did not start it.   (when was pregnant with 3rd had lesion on nipple and some breast lumps but created anxiety over health) does fixated on her health sometimes.  Never took an antianxiety medication before.  Says already had terrible anxiety -claustrophobia and now has morphed into this.   Never took the lexapro. Very aware of her catastrophic thinking. Even bathrooms and feeling like locked in and cant escape.  She makes cookies as a passion and is good that it [employer]ights   Definitely not a depression. No strong family hx.  Only had panic attack once.      They are taking a trip soon and she is a little anxious about the flight although typically does not get anxious as long as she is sitting next to the window.  She indicates she does not like turbulence.    Today's PHQ-2 Score:   PHQ-2 ( 1999 Pfizer) 1/7/2022   Q1: Little interest or pleasure in  doing things 0   Q2: Feeling down, depressed or hopeless 0   PHQ-2 Score 0   PHQ-2 Total Score (12-17 Years)- Positive if 3 or more points; Administer PHQ-A if positive -   Q1: Little interest or pleasure in doing things Not at all   Q2: Feeling down, depressed or hopeless Not at all   PHQ-2 Score 0       Abuse: Current or Past (Physical, Sexual or Emotional) - No  Do you feel safe in your environment? Yes    Have you ever done Advance Care Planning? (For example, a Health Directive, POLST, or a discussion with a medical provider or your loved ones about your wishes): No, advance care planning information given to patient to review.  Patient plans to discuss their wishes with loved ones or provider.      Social History     Tobacco Use     Smoking status: Former Smoker     Smokeless tobacco: Former User     Quit date: 8/17/2010     Tobacco comment: 4-5 years smoked   Substance Use Topics     Alcohol use: No         Alcohol Use 1/7/2022   Prescreen: >3 drinks/day or >7 drinks/week? No       Reviewed orders with patient.  Reviewed health maintenance and updated orders accordingly - Yes  Patient Active Problem List   Diagnosis     Palpitations     Nasal polyp     Past Surgical History:   Procedure Laterality Date     NO PAST SURGERIES         Social History     Tobacco Use     Smoking status: Former Smoker     Smokeless tobacco: Former User     Quit date: 8/17/2010     Tobacco comment: 4-5 years smoked   Substance Use Topics     Alcohol use: No     Family History   Problem Relation Age of Onset     Heart Disease Maternal Grandfather      Cerebrovascular Disease Maternal Grandfather      Colon Cancer Paternal Grandfather      Heart Disease Father         mild heart attack     Dementia Paternal Grandmother          Current Outpatient Medications   Medication Sig Dispense Refill     ALPRAZolam (XANAX) 0.25 MG tablet Take 1-2 tablets by mouth daily as needed for acute panic attack 4 tablet 0     diphenhydrAMINE HCl  (BENADRYL PO)        escitalopram (LEXAPRO) 5 MG tablet Take 1 tablet (5 mg) by mouth daily 30 tablet 1     propranolol (INDERAL) 10 MG tablet Take 1 tablet by mouth daily  prior to flying or panic situation 30 tablet 0     Allergies   Allergen Reactions     Advil [Ibuprofen] Unknown     Sinus reaction sneezing     No Known Drug Allergies Unknown     Other Environmental Allergy Unknown       Breast Cancer Screening:    Breast CA Risk Assessment (FHS-7) 2022   Do you have a family history of breast, colon, or ovarian cancer? No / Unknown           Pertinent mammograms are reviewed under the imaging tab.    History of abnormal Pap smear: NO - age 30-65 PAP every 5 years with negative HPV co-testing recommended  PAP / HPV 2014   PAP Negative for squamous intraepithelial lesion or malignancy  Electronically signed by Kacey Resendez CT (ASCP) on 2017 at 12:19 PM   Negative for squamous intraepithelial lesion or malignancy  Electronically signed by Monica Porras CT (ASCP) on 2014 at 12:27 PM       Reviewed and updated as needed this visit by clinical staff    Meds             Reviewed and updated as needed this visit by Provider               Past Medical History:   Diagnosis Date     Nasal polyps       Past Surgical History:   Procedure Laterality Date     NO PAST SURGERIES       OB History    Para Term  AB Living   3 3 3 0 0 3   SAB IAB Ectopic Multiple Live Births   0 0 0 0 3      # Outcome Date GA Lbr Lloyd/2nd Weight Sex Delivery Anes PTL Lv   3 Term 19 40w0d 05:00 / 00:12 2.722 kg (6 lb) M Vag-Vacuum Local, Nitrous N ARISTIDES      Name: CARMEN SIMON      Apgar1: 8  Apgar5: 9   2 Term 13 39w4d / 02:15 3.147 kg (6 lb 15 oz) M Vag-Spont EPI  ARISTIDES      Birth Comments: PROM with no contractions, Pitocin       Name: Teo   1 Term 11/16/10 40w0d / 03:00 3.459 kg (7 lb 10 oz) F Vag-Spont EPI  ARISTIDES      Birth Comments: Episiotomy for prolonged 2nd stage, MSAF       "Name: Ailyn       Review of Systems  CONSTITUTIONAL: NEGATIVE for fever, chills, change in weight  INTEGUMENTARU/SKIN: NEGATIVE for worrisome rashes, moles or lesions  EYES: NEGATIVE for vision changes or irritation  ENT: NEGATIVE for ear, mouth and throat problems  RESP: NEGATIVE for significant cough or SOB  BREAST: NEGATIVE for masses, tenderness or discharge  CV: NEGATIVE for chest pain, palpitations or peripheral edema  GI: NEGATIVE for nausea, abdominal pain, heartburn, or change in bowel habits  : NEGATIVE for unusual urinary or vaginal symptoms. Periods are regular.  MUSCULOSKELETAL: NEGATIVE for significant arthralgias or myalgia  NEURO: NEGATIVE for weakness, dizziness or paresthesias  ENDOCRINE: NEGATIVE for temperature intolerance, skin/hair changes  HEME/ALLERGY/IMMUNE: NEGATIVE for bleeding problems  PSYCHIATRIC: NEGATIVE for changes in mood or affect     OBJECTIVE:   /84   Pulse 84   Temp 97.9  F (36.6  C) (Oral)   Resp 12   Ht 1.638 m (5' 4.5\")   Wt 55.2 kg (121 lb 12.8 oz)   SpO2 100%   BMI 20.58 kg/m    Physical Exam  GENERAL: healthy, alert and no distress  EYES: Eyes grossly normal to inspection, PERRL and conjunctivae and sclerae normal  HENT: ear canals and TM's normal, nose and mouth without ulcers or lesions  NECK: no adenopathy, no asymmetry, masses, or scars and thyroid normal to palpation  RESP: lungs clear to auscultation - no rales, rhonchi or wheezes  BREAST: normal without masses, tenderness or nipple discharge and no palpable axillary masses or adenopathy  CV: regular rate and rhythm, normal S1 S2, no S3 or S4, no murmur, click or rub, no peripheral edema and peripheral pulses strong  ABDOMEN: soft, nontender, no hepatosplenomegaly, no masses and bowel sounds normal   (female): normal female external genitalia, normal urethral meatus, vaginal mucosa pink, moist, well rugated, and normal cervix/adnexa/uterus without masses or discharge  MS: no gross musculoskeletal " defects noted, no edema  SKIN: no suspicious lesions or rashes  NEURO: Normal strength and tone, mentation intact and speech normal  PSYCH: mentation appears normal, affect normal/bright    Diagnostic Test Results:  Labs reviewed in Epic  No results found for any visits on 01/07/22.    ASSESSMENT/PLAN:      Encounter for general adult medical examination with abnormal findings    -  Primary    Screening for cervical cancer        Relevant Orders    Gynecologic Cytology (PAP)    Abdominal mass, unspecified abdominal location    -reassured patient that I do not feel a palpable mass.  Could be a hernia and that she feels that she is able to reduce.  Will perform abdominal ultrasound for peace of mind.  Patient declined labs at this time    Relevant Orders    US Abdomen Complete        Problem List Items Addressed This Visit     Anxiety about health    Relevant Medications    escitalopram (LEXAPRO) 5 MG tablet    ALPRAZolam (XANAX) 0.25 MG tablet    KARLOS (generalized anxiety disorder)     Patient is considering initiating an SSRI for treatment of generalized anxiety and catastrophic thinking.  Was already given a prescription for Lexapro which we discussed is reasonable but I would start at a lower dose of 5 mg and start with half tablet daily and increase to 5 and then we can go up to 10 if appropriate.  If not tolerating well consider alternatives such as citalopram or sertraline.    I did give her a prescription for panic attack which she has rarely had but to give her that since of empowerment of having something if needed in a situation I think will reduce the likelihood of a panic attacks only several tablets of Xanax were given to patient.    We discussed doing an E visit to follow-up on the medication in 4 to 6 weeks            Relevant Medications    escitalopram (LEXAPRO) 5 MG tablet    ALPRAZolam (XANAX) 0.25 MG tablet    propranolol (INDERAL) 10 MG tablet    Claustrophobia/ elevators     See information under  "generalized anxiety.  She does have an upcoming flight and I gave her a prescription for propranolol and indicated she should consider taking that medication prior to her flight to see if this helps reduce symptoms of anxiousness         Relevant Medications    escitalopram (LEXAPRO) 5 MG tablet    ALPRAZolam (XANAX) 0.25 MG tablet      Other Visit Diagnoses         Patient Instructions   Start lexapro 5mg - 1/2 tablet once daily for a week , if tolerating, go to full tablet daily and follow up in 4-6 weeks E visit.           Patient has been advised of split billing requirements and indicates understanding: Yes  COUNSELING:  Reviewed preventive health counseling, as reflected in patient instructions       Regular exercise       Healthy diet/nutrition       Advance Care Planning       Forms given to patient today    Estimated body mass index is 20.58 kg/m  as calculated from the following:    Height as of this encounter: 1.638 m (5' 4.5\").    Weight as of this encounter: 55.2 kg (121 lb 12.8 oz).         She reports that she has quit smoking. She quit smokeless tobacco use about 11 years ago.      Counseling Resources:  ATP IV Guidelines  Pooled Cohorts Equation Calculator  Breast Cancer Risk Calculator  BRCA-Related Cancer Risk Assessment: FHS-7 Tool  FRAX Risk Assessment  ICSI Preventive Guidelines  Dietary Guidelines for Americans, 2010  USDA's MyPlate  ASA Prophylaxis  Lung CA Screening    Vaishali Yu,  [facility]  "

## 2022-01-07 NOTE — PATIENT INSTRUCTIONS
Start lexapro 5mg - 1/2 tablet once daily for a week , if tolerating, go to full tablet daily and follow up in 4-6 weeks E visit.

## 2022-01-08 PROBLEM — J33.9 NASAL POLYP: Status: RESOLVED | Noted: 2017-08-17 | Resolved: 2022-01-08

## 2022-01-08 PROBLEM — F40.240 CLAUSTROPHOBIA: Status: ACTIVE | Noted: 2022-01-08

## 2022-01-08 PROBLEM — F41.1 GAD (GENERALIZED ANXIETY DISORDER): Status: ACTIVE | Noted: 2022-01-08

## 2022-01-08 PROBLEM — R45.89 ANXIETY ABOUT HEALTH: Status: ACTIVE | Noted: 2022-01-08

## 2022-01-09 NOTE — ASSESSMENT & PLAN NOTE
See information under generalized anxiety.  She does have an upcoming flight and I gave her a prescription for propranolol and indicated she should consider taking that medication prior to her flight to see if this helps reduce symptoms of anxiousness

## 2022-01-09 NOTE — ASSESSMENT & PLAN NOTE
Patient is considering initiating an SSRI for treatment of generalized anxiety and catastrophic thinking.  Was already given a prescription for Lexapro which we discussed is reasonable but I would start at a lower dose of 5 mg and start with half tablet daily and increase to 5 and then we can go up to 10 if appropriate.  If not tolerating well consider alternatives such as citalopram or sertraline.    I did give her a prescription for panic attack which she has rarely had but to give her that since of empowerment of having something if needed in a situation I think will reduce the likelihood of a panic attacks only several tablets of Xanax were given to patient.    We discussed doing an E visit to follow-up on the medication in 4 to 6 weeks

## 2022-01-12 LAB
HUMAN PAPILLOMA VIRUS 16 DNA: NEGATIVE
HUMAN PAPILLOMA VIRUS 18 DNA: NEGATIVE
HUMAN PAPILLOMA VIRUS FINAL DIAGNOSIS: NORMAL
HUMAN PAPILLOMA VIRUS OTHER HR: NEGATIVE

## 2022-01-14 LAB
BKR LAB AP GYN ADEQUACY: NORMAL
BKR LAB AP GYN INTERPRETATION: NORMAL
BKR LAB AP HPV REFLEX: NORMAL
BKR LAB AP LMP: NORMAL
BKR LAB AP PREVIOUS ABNORMAL: NORMAL
PATH REPORT.COMMENTS IMP SPEC: NORMAL
PATH REPORT.COMMENTS IMP SPEC: NORMAL
PATH REPORT.RELEVANT HX SPEC: NORMAL

## 2022-09-18 ENCOUNTER — HEALTH MAINTENANCE LETTER (OUTPATIENT)
Age: 37
End: 2022-09-18

## 2023-02-06 NOTE — TELEPHONE ENCOUNTER
"Has a lot of bug bites.  Few have bruising around them.    Was sitting outside Friday so most of them are from then.    1.5\" size each x 4 of them     Transferred to scheduling for an appointment.    Delia Mares RN [facility] Triage Nurse Advisor      Reason for Disposition    SEVERE local itching (i.e., interferes with work, school, activities) and not improved after 24 hours of hydrocortisone cream    Additional Information    Negative: Passed out (i.e., fainted, collapsed and was not responding)    Negative: Wheezing or difficulty breathing    Negative: Hoarseness, cough or tightness in the throat or chest    Negative: Swollen tongue or difficulty swallowing    Negative: Life-threatening reaction (anaphylaxis) in the past to same insect bite and < 2 hours since bite    Negative: Sounds like a life-threatening emergency to the triager    Negative: Bee sting(s)    Negative: Spider bite(s)    Negative: Tick bite(s)    Negative: Mosquito bite(s)    Negative: Doesn't sound like an insect bite    Negative: Patient sounds very sick or weak to the triager    Negative: SEVERE bite pain and not improved after 2 hours of pain medicine    Negative: Fever and area is red    Negative: Fever and area is very tender to touch    Negative: Red streak or red line and length > 2 inches (5 cm)    Negative: Red or very tender (to touch) area, and started over 24 hours after the bite    Negative: Red or very tender (to touch) area, getting larger over 48 hours after the bite    Negative: No prior tetanus shots (or is not fully vaccinated)    Negative: Patient wants to be seen    Protocols used: INSECT BITE-A-[place]      " Olumiant Counseling: I discussed with the patient the risks of Olumiant therapy including but not limited to upper respiratory tract infections, shingles, cold sores, and nausea. Live vaccines should be avoided.  This medication has been linked to serious infections; higher rate of mortality; malignancy and lymphoproliferative disorders; major adverse cardiovascular events; thrombosis; gastrointestinal perforations; neutropenia; lymphopenia; anemia; liver enzyme elevations; and lipid elevations.

## 2023-07-30 ENCOUNTER — HEALTH MAINTENANCE LETTER (OUTPATIENT)
Age: 38
End: 2023-07-30

## 2024-09-22 ENCOUNTER — HEALTH MAINTENANCE LETTER (OUTPATIENT)
Age: 39
End: 2024-09-22